# Patient Record
Sex: MALE | Race: WHITE | Employment: FULL TIME | URBAN - METROPOLITAN AREA
[De-identification: names, ages, dates, MRNs, and addresses within clinical notes are randomized per-mention and may not be internally consistent; named-entity substitution may affect disease eponyms.]

---

## 2021-07-22 ENCOUNTER — APPOINTMENT (OUTPATIENT)
Dept: CT IMAGING | Facility: CLINIC | Age: 26
End: 2021-07-22
Attending: EMERGENCY MEDICINE
Payer: COMMERCIAL

## 2021-07-22 ENCOUNTER — HOSPITAL ENCOUNTER (EMERGENCY)
Facility: CLINIC | Age: 26
Discharge: HOME | End: 2021-07-22
Attending: EMERGENCY MEDICINE | Admitting: EMERGENCY MEDICINE
Payer: COMMERCIAL

## 2021-07-22 VITALS
DIASTOLIC BLOOD PRESSURE: 71 MMHG | TEMPERATURE: 98 F | SYSTOLIC BLOOD PRESSURE: 115 MMHG | OXYGEN SATURATION: 97 % | RESPIRATION RATE: 16 BRPM | HEART RATE: 71 BPM

## 2021-07-22 DIAGNOSIS — R56.9 SEIZURE-LIKE ACTIVITY (H): ICD-10-CM

## 2021-07-22 LAB
ALBUMIN SERPL-MCNC: 3.8 G/DL (ref 3.4–5)
ALP SERPL-CCNC: 58 U/L (ref 40–150)
ALT SERPL W P-5'-P-CCNC: 30 U/L (ref 0–70)
ANION GAP SERPL CALCULATED.3IONS-SCNC: 2 MMOL/L (ref 3–14)
AST SERPL W P-5'-P-CCNC: 19 U/L (ref 0–45)
ATRIAL RATE - MUSE: 74 BPM
BASOPHILS # BLD AUTO: 0 10E3/UL (ref 0–0.2)
BASOPHILS NFR BLD AUTO: 0 %
BILIRUB SERPL-MCNC: 0.3 MG/DL (ref 0.2–1.3)
BUN SERPL-MCNC: 22 MG/DL (ref 7–30)
CALCIUM SERPL-MCNC: 9.2 MG/DL (ref 8.5–10.1)
CHLORIDE BLD-SCNC: 104 MMOL/L (ref 94–109)
CO2 SERPL-SCNC: 31 MMOL/L (ref 20–32)
CREAT SERPL-MCNC: 1.21 MG/DL (ref 0.66–1.25)
DIASTOLIC BLOOD PRESSURE - MUSE: NORMAL MMHG
EOSINOPHIL # BLD AUTO: 0.3 10E3/UL (ref 0–0.7)
EOSINOPHIL NFR BLD AUTO: 7 %
ERYTHROCYTE [DISTWIDTH] IN BLOOD BY AUTOMATED COUNT: 11.9 % (ref 10–15)
ETHANOL SERPL-MCNC: <0.01 G/DL
GFR SERPL CREATININE-BSD FRML MDRD: 83 ML/MIN/1.73M2
GLUCOSE BLD-MCNC: 128 MG/DL (ref 70–99)
HCT VFR BLD AUTO: 41.8 % (ref 40–53)
HGB BLD-MCNC: 14.1 G/DL (ref 13.3–17.7)
HOLD SPECIMEN: NORMAL
IMM GRANULOCYTES # BLD: 0 10E3/UL
IMM GRANULOCYTES NFR BLD: 0 %
INTERPRETATION ECG - MUSE: NORMAL
LYMPHOCYTES # BLD AUTO: 1.8 10E3/UL (ref 0.8–5.3)
LYMPHOCYTES NFR BLD AUTO: 39 %
MCH RBC QN AUTO: 30.7 PG (ref 26.5–33)
MCHC RBC AUTO-ENTMCNC: 33.7 G/DL (ref 31.5–36.5)
MCV RBC AUTO: 91 FL (ref 78–100)
MONOCYTES # BLD AUTO: 0.3 10E3/UL (ref 0–1.3)
MONOCYTES NFR BLD AUTO: 7 %
NEUTROPHILS # BLD AUTO: 2.2 10E3/UL (ref 1.6–8.3)
NEUTROPHILS NFR BLD AUTO: 47 %
NRBC # BLD AUTO: 0 10E3/UL
NRBC BLD AUTO-RTO: 0 /100
P AXIS - MUSE: 66 DEGREES
PLATELET # BLD AUTO: 217 10E3/UL (ref 150–450)
POTASSIUM BLD-SCNC: 3.4 MMOL/L (ref 3.4–5.3)
PR INTERVAL - MUSE: 142 MS
PROT SERPL-MCNC: 7.1 G/DL (ref 6.8–8.8)
QRS DURATION - MUSE: 96 MS
QT - MUSE: 378 MS
QTC - MUSE: 419 MS
R AXIS - MUSE: 75 DEGREES
RBC # BLD AUTO: 4.59 10E6/UL (ref 4.4–5.9)
SODIUM SERPL-SCNC: 137 MMOL/L (ref 133–144)
SYSTOLIC BLOOD PRESSURE - MUSE: NORMAL MMHG
T AXIS - MUSE: 58 DEGREES
VENTRICULAR RATE- MUSE: 74 BPM
WBC # BLD AUTO: 4.7 10E3/UL (ref 4–11)

## 2021-07-22 PROCEDURE — 36415 COLL VENOUS BLD VENIPUNCTURE: CPT | Performed by: EMERGENCY MEDICINE

## 2021-07-22 PROCEDURE — 93005 ELECTROCARDIOGRAM TRACING: CPT

## 2021-07-22 PROCEDURE — 70450 CT HEAD/BRAIN W/O DYE: CPT

## 2021-07-22 PROCEDURE — 82040 ASSAY OF SERUM ALBUMIN: CPT | Performed by: EMERGENCY MEDICINE

## 2021-07-22 PROCEDURE — 82077 ASSAY SPEC XCP UR&BREATH IA: CPT | Performed by: EMERGENCY MEDICINE

## 2021-07-22 PROCEDURE — 85025 COMPLETE CBC W/AUTO DIFF WBC: CPT | Performed by: EMERGENCY MEDICINE

## 2021-07-22 PROCEDURE — 99285 EMERGENCY DEPT VISIT HI MDM: CPT | Mod: 25

## 2021-07-22 ASSESSMENT — ENCOUNTER SYMPTOMS
SHORTNESS OF BREATH: 0
FEVER: 0
WEAKNESS: 0
CHILLS: 0
NUMBNESS: 0
NAUSEA: 1
SEIZURES: 1
HEADACHES: 0

## 2021-07-22 NOTE — LETTER
July 22, 2021      To Whom It May Concern:      Miguel Rios was seen in our Emergency Department today, 07/22/21.  I expect his condition to improve over the next 1-2 days.  He may return to work/school when improved.      Sincerely,        Alin Jones RN

## 2021-07-22 NOTE — ED PROVIDER NOTES
"  History   Chief Complaint:  Seizures       HPI   Miguel Rios is a 26 year old male with history of presumed seizures who presents with concerns for a seizure. He reports waking up from sleep feeling like he \"grabbed a live-wire\", was being shocked, and like he \"dropped\". His girlfriend reports that he was unconscious for 30 seconds, his eyes rolled back, and his arms stiffened. This episode he reports nausea and was incontinent of urine. The patient reports having a seizure 4 years ago, 1 month ago, and last night. While he has not seen a neurologist to confirm that they were seizures, he reports that the symptoms were the same. Denies chest pain, shortness of breath, fever, chills, visual changes, weakness on one side, numbness, tingling, or a headache. States that he did not bite his tongue though does admit to urinary incontinence. Girlfriend denies significant post-ictal period.  Denies drug use and his last drink was 4 days ago .    Review of Systems   Constitutional: Negative for chills and fever.   Eyes: Negative for visual disturbance.   Respiratory: Negative for shortness of breath.    Cardiovascular: Negative for chest pain.   Gastrointestinal: Positive for nausea.   Genitourinary:        +Incontinent of urine   Neurological: Positive for seizures. Negative for weakness, numbness and headaches.   All other systems reviewed and are negative.      Allergies:  The patient has no known allergies.     Medications:  The patient is currently on no regular medications.    Past Medical History:    Seizures, presumed    Social History:  The patient presented to the ER with his girlfriend.    Physical Exam     Patient Vitals for the past 24 hrs:   BP Temp Pulse Resp SpO2   07/22/21 0550 -- -- 71 16 97 %   07/22/21 0540 -- -- 65 -- 99 %   07/22/21 0520 -- -- -- -- 100 %   07/22/21 0515 115/71 -- 65 -- 95 %   07/22/21 0500 111/60 -- 73 10 99 %   07/22/21 0319 107/60 98  F (36.7  C) 76 18 100 %       Physical " Exam  General: Well-nourished, nontoxic  Eyes: PERRL, EOMI, no nystagmus.  No scleral icterus or conjunctival injection.    ENT:  Moist mucus membranes, posterior oropharynx clear without erythema or exudates. TM normal bilaterally  Neck: Supple with full range of motion  Respiratory:  Lungs clear to auscultation bilaterally, no crackles/rubs/wheezes.  Good air movement  CV: Normal rate and rhythm, no murmurs/rubs/gallops  GI:  Abdomen soft and non-distended.  No tenderness, guarding or rebound  Skin: Warm, dry.  No rashes or petechiae  MSK: No peripheral edema or calf tenderness  Neuro: Alert and oriented to person/place/time.  No aphasia/facial droop/dysarthria.  Tongue midline, normal strength at SCM/trapezius/BUE/BLE.  Normal finger to nose. Normal gait.  Sensation intact over face/BUE/BLE  Psychiatric: Normal affect      Emergency Department Course   ECG  ECG taken at 0450, ECG read at 0450  Normal sinus rhythm. Normal ECG.   Rate 74 bpm. AL interval 142 ms. QRS duration 96 ms. QT/QTc 378/419 ms. P-R-T axes 66 75 58.     Imaging:  Head CT w/o Contrast   1.  No acute intracranial process.  As per radiology.    Laboratory:  CMP: Glucose: 128 (H), Anion Gap: 2 (L) o/w WNL (Creatinine 1.21)     CBC: WBC 4.7, HGB 14.1,      Alcohol level blood: <0.01    Emergency Department Course:    Reviewed:  I reviewed nursing notes, vitals, past medical history and care everywhere    Assessments:  0422 I obtained history and examined the patient as noted above.      I rechecked the patient and explained findings.     Disposition:  The patient was discharged to home.     Impression & Plan     Medical Decision Making:  This is a 26 year old male presenting after possible seizure.  ABCs were intact on presentation and there was no sign of trauma and no need for c-spine precautions based on history and presentation.  He has VS that are stable and appropriate and a physical exam that shows no acute abnormalities, of note  there are no focal neurologic findings or changes to baseline mental status at this time.  EKG without underlying arrhythmia or ischemic changes.    DDX includes:  Hypoglycemia: .  No history of glycemic control issues.  Drugs:  No hx of recent ingestion, ETOH, drugs, or new medications, or unintentional ingestion.  Electrolyte abnormality: No gross abnormalities noted.   Infection:  No altered mental status now.  No recent history of confusion or altered mental status or confusion.  No signs or symptoms indicating CNS infection.  Space Occupying lesion: CT scan negative.   Stroke/TIA:  History not consistent with this, no significant risk factors or findings consistent with stroke or TIA    Concern for possible seizure based on reported events.  The history, physical exam, and results detect no life threatening cause at this time, nor do they indicate the patient is currently suffering from one of the previously mentioned conditions.  Unfortunately a clear exam and results today do not ensure freedom from a severe disease process in the future-- even within hours, or the possibility that there is a dangerous process currently at work but currently undetected or undiagnosed, this was clearly conveyed to the patient/family.  For this reason the patient is advised to seek immediate re-evaluation in the the ED if there is a worsening of their condition such as headache, altered mental status, or another seizure, and to be seen by a more consistent care-giver, such as their PMD, if the symptoms persist more than one day.  These instructions were clearly stated and were repeated back to me by a competent patient who agreed to them. Plan for close outpatient neurology f/u, referral provided today.     I have advised the patient of the following: no driving for 6 months and may only return to driving if cleared by personal medical provider, no climbing heights, operating heavy machinery, cooking, bathing and being  involved in activities that may be dangerous to self or others should you have a seizure.          Diagnosis:    ICD-10-CM    1. Seizure-like activity (H)  R56.9        Scribe Disclosure:  I, TIFFANIE GOODMAN, am serving as a scribe at 4:21 AM on 7/22/2021 to document services personally performed by Shea Aponte DO based on my observations and the provider's statements to me.     I, Andriy Munoz, am serving as a scribe  at 5:58 AM on 7/22/2021 to document services personally performed by Shea Aponte DO based on my observations and the provider's statements to me.          Shea Aponte DO  07/22/21 0624

## 2021-07-22 NOTE — LETTER
07/22/21      To Whom it may concern:    Yumi Granado was in our Emergency Department today, 07/22/21 with a patient who needed their assistance.  Please excuse them from work/school today.      Sincerely,      Alin Jones RN

## 2021-07-22 NOTE — ED TRIAGE NOTES
"Patient presents to ED d/t neck pain and having seizure. Patient reports he was in bed and felt a sharp pain R side of neck and doesn't remember anything else. Seizure was witnessed by girlfriend that lasted 30 seconds.   States, \" his arms clenched and his eyes rolled back of head\"    Has had seizures in the past, last one was a month ago       ABC intact  A/ox4   "

## 2021-08-13 ENCOUNTER — LAB (OUTPATIENT)
Dept: LAB | Facility: CLINIC | Age: 26
End: 2021-08-13
Payer: COMMERCIAL

## 2021-08-13 DIAGNOSIS — R56.9 GENERALIZED CONVULSIVE SEIZURE (H): ICD-10-CM

## 2021-08-13 PROCEDURE — 80177 DRUG SCRN QUAN LEVETIRACETAM: CPT

## 2021-08-13 PROCEDURE — 36415 COLL VENOUS BLD VENIPUNCTURE: CPT

## 2021-08-14 LAB — LEVETIRACETAM SERPL-MCNC: 14 UG/ML
